# Patient Record
Sex: FEMALE | Race: WHITE | NOT HISPANIC OR LATINO | Employment: FULL TIME | URBAN - METROPOLITAN AREA
[De-identification: names, ages, dates, MRNs, and addresses within clinical notes are randomized per-mention and may not be internally consistent; named-entity substitution may affect disease eponyms.]

---

## 2023-09-18 ENCOUNTER — LAB (OUTPATIENT)
Dept: LAB | Facility: LAB | Age: 63
End: 2023-09-18
Payer: COMMERCIAL

## 2023-09-18 ENCOUNTER — OFFICE VISIT (OUTPATIENT)
Dept: PRIMARY CARE | Facility: CLINIC | Age: 63
End: 2023-09-18
Payer: COMMERCIAL

## 2023-09-18 VITALS
SYSTOLIC BLOOD PRESSURE: 158 MMHG | HEART RATE: 74 BPM | WEIGHT: 250 LBS | HEIGHT: 67 IN | DIASTOLIC BLOOD PRESSURE: 84 MMHG | TEMPERATURE: 98 F | OXYGEN SATURATION: 97 % | BODY MASS INDEX: 39.24 KG/M2

## 2023-09-18 DIAGNOSIS — M25.50 PAIN IN JOINT INVOLVING MULTIPLE SITES: ICD-10-CM

## 2023-09-18 DIAGNOSIS — M25.50 PAIN IN JOINT INVOLVING MULTIPLE SITES: Primary | ICD-10-CM

## 2023-09-18 DIAGNOSIS — M25.572 PAIN AND SWELLING OF LEFT ANKLE: ICD-10-CM

## 2023-09-18 DIAGNOSIS — R00.2 PALPITATIONS: ICD-10-CM

## 2023-09-18 DIAGNOSIS — R03.0 ELEVATED BLOOD PRESSURE READING: ICD-10-CM

## 2023-09-18 DIAGNOSIS — M25.472 PAIN AND SWELLING OF LEFT ANKLE: ICD-10-CM

## 2023-09-18 DIAGNOSIS — Z12.31 ENCOUNTER FOR SCREENING MAMMOGRAM FOR MALIGNANT NEOPLASM OF BREAST: ICD-10-CM

## 2023-09-18 PROBLEM — D69.6 THROMBOCYTOPENIA (CMS-HCC): Status: RESOLVED | Noted: 2023-09-18 | Resolved: 2023-09-18

## 2023-09-18 PROBLEM — N89.8 VAGINAL DISCHARGE: Status: RESOLVED | Noted: 2023-09-18 | Resolved: 2023-09-18

## 2023-09-18 PROBLEM — R91.8 PULMONARY NODULES: Status: RESOLVED | Noted: 2023-09-18 | Resolved: 2023-09-18

## 2023-09-18 PROBLEM — Z86.718 PERSONAL HISTORY OF DVT (DEEP VEIN THROMBOSIS): Status: RESOLVED | Noted: 2023-09-18 | Resolved: 2023-09-18

## 2023-09-18 PROBLEM — R93.89 THICKENED ENDOMETRIUM: Status: RESOLVED | Noted: 2023-09-18 | Resolved: 2023-09-18

## 2023-09-18 PROBLEM — I82.409 DEEP VEIN THROMBOSIS OF LOWER EXTREMITY (MULTI): Status: ACTIVE | Noted: 2023-09-18

## 2023-09-18 PROBLEM — J06.9 VIRAL URI WITH COUGH: Status: RESOLVED | Noted: 2023-09-18 | Resolved: 2023-09-18

## 2023-09-18 PROBLEM — R06.83 SNORING: Status: RESOLVED | Noted: 2023-09-18 | Resolved: 2023-09-18

## 2023-09-18 PROBLEM — I87.001 POST-THROMBOTIC SYNDROME OF RIGHT LOWER EXTREMITY: Status: RESOLVED | Noted: 2023-09-18 | Resolved: 2023-09-18

## 2023-09-18 PROBLEM — R19.00 PELVIC MASS IN FEMALE: Status: RESOLVED | Noted: 2023-09-18 | Resolved: 2023-09-18

## 2023-09-18 LAB
ALANINE AMINOTRANSFERASE (SGPT) (U/L) IN SER/PLAS: 15 U/L (ref 7–45)
ALBUMIN (G/DL) IN SER/PLAS: 3.9 G/DL (ref 3.4–5)
ALKALINE PHOSPHATASE (U/L) IN SER/PLAS: 77 U/L (ref 33–136)
ANION GAP IN SER/PLAS: 13 MMOL/L (ref 10–20)
ASPARTATE AMINOTRANSFERASE (SGOT) (U/L) IN SER/PLAS: 15 U/L (ref 9–39)
BILIRUBIN TOTAL (MG/DL) IN SER/PLAS: 0.4 MG/DL (ref 0–1.2)
C REACTIVE PROTEIN (MG/L) IN SER/PLAS: 0.13 MG/DL
CALCIUM (MG/DL) IN SER/PLAS: 9.1 MG/DL (ref 8.6–10.3)
CARBON DIOXIDE, TOTAL (MMOL/L) IN SER/PLAS: 28 MMOL/L (ref 21–32)
CHLORIDE (MMOL/L) IN SER/PLAS: 105 MMOL/L (ref 98–107)
CREATININE (MG/DL) IN SER/PLAS: 0.77 MG/DL (ref 0.5–1.05)
GFR FEMALE: 86 ML/MIN/1.73M2
GLUCOSE (MG/DL) IN SER/PLAS: 75 MG/DL (ref 74–99)
POC APPEARANCE, URINE: CLEAR
POC BILIRUBIN, URINE: NEGATIVE
POC BLOOD, URINE: NEGATIVE
POC COLOR, URINE: YELLOW
POC GLUCOSE, URINE: NEGATIVE MG/DL
POC KETONES, URINE: NEGATIVE MG/DL
POC LEUKOCYTES, URINE: NEGATIVE
POC NITRITE,URINE: NEGATIVE
POC PH, URINE: 6.5 PH
POC PROTEIN, URINE: NEGATIVE MG/DL
POC SPECIFIC GRAVITY, URINE: <=1.005
POC UROBILINOGEN, URINE: 0.2 EU/DL
POTASSIUM (MMOL/L) IN SER/PLAS: 3.9 MMOL/L (ref 3.5–5.3)
PROTEIN TOTAL: 5.8 G/DL (ref 6.4–8.2)
SEDIMENTATION RATE, ERYTHROCYTE: 11 MM/H (ref 0–30)
SODIUM (MMOL/L) IN SER/PLAS: 142 MMOL/L (ref 136–145)
THYROTROPIN (MIU/L) IN SER/PLAS BY DETECTION LIMIT <= 0.05 MIU/L: 1.41 MIU/L (ref 0.44–3.98)
URATE (MG/DL) IN SER/PLAS: 4.1 MG/DL (ref 2.3–6.7)
UREA NITROGEN (MG/DL) IN SER/PLAS: 10 MG/DL (ref 6–23)

## 2023-09-18 PROCEDURE — 1036F TOBACCO NON-USER: CPT | Performed by: NURSE PRACTITIONER

## 2023-09-18 PROCEDURE — 86431 RHEUMATOID FACTOR QUANT: CPT

## 2023-09-18 PROCEDURE — 86200 CCP ANTIBODY: CPT

## 2023-09-18 PROCEDURE — 84443 ASSAY THYROID STIM HORMONE: CPT

## 2023-09-18 PROCEDURE — 85652 RBC SED RATE AUTOMATED: CPT

## 2023-09-18 PROCEDURE — 86038 ANTINUCLEAR ANTIBODIES: CPT

## 2023-09-18 PROCEDURE — 36415 COLL VENOUS BLD VENIPUNCTURE: CPT

## 2023-09-18 PROCEDURE — 80053 COMPREHEN METABOLIC PANEL: CPT

## 2023-09-18 PROCEDURE — 99214 OFFICE O/P EST MOD 30 MIN: CPT | Performed by: NURSE PRACTITIONER

## 2023-09-18 PROCEDURE — 86140 C-REACTIVE PROTEIN: CPT

## 2023-09-18 PROCEDURE — 81003 URINALYSIS AUTO W/O SCOPE: CPT | Performed by: NURSE PRACTITIONER

## 2023-09-18 PROCEDURE — 84550 ASSAY OF BLOOD/URIC ACID: CPT

## 2023-09-18 RX ORDER — RIVAROXABAN 10 MG/1
10 TABLET, FILM COATED ORAL DAILY
COMMUNITY

## 2023-09-18 RX ORDER — ALBUTEROL SULFATE 90 UG/1
AEROSOL, METERED RESPIRATORY (INHALATION)
COMMUNITY
Start: 2020-01-28

## 2023-09-18 ASSESSMENT — ENCOUNTER SYMPTOMS
CONSTITUTIONAL NEGATIVE: 1
MUSCULOSKELETAL NEGATIVE: 1
RESPIRATORY NEGATIVE: 1
PSYCHIATRIC NEGATIVE: 1
PALPITATIONS: 1

## 2023-09-18 NOTE — PATIENT INSTRUCTIONS
we discussed a few matters concerning your health. I want to provide a summary of our conversation and emphasize some key points:    1. Joint Pain and Redness: We've observed symptoms that might hint at gout. To delve deeper into this and evaluate other potential causes like rheumatoid arthritis, I've ordered specific blood tests.    2. Importance of Hydration: A critical aspect we talked about is the role of proper hydration. Not drinking enough water can lead to dehydration, which can manifest as various symptoms - from joint pain, fatigue, headaches, to palpitations. Consistently staying hydrated can help alleviate some of the symptoms you've described. Please make an effort to increase your water intake. Aim for at least 8 glasses a day or more, depending on your activity level.    3. Palpitations and Elevated Blood Pressure: While anxiety might explain the palpitations you've experienced, we can't overlook other potential causes, especially given today's elevated blood pressure reading. Thyroid tests have been ordered to investigate further.    Urgent Note: Should you experience palpitations, chest pain, or other related symptoms, it's imperative to immediately call 911 or visit the ER. These symptoms might indicate a heart-related issue, requiring quick medical attention.    4. Upcoming Physical: Please ensure you book a comprehensive physical in 4 weeks. During this appointment, we can perform fasting blood tests and, if needed, an EKG to assess your heart. This will also provide an opportunity to reevaluate your joint pain.    Your health and well-being remain our top priority. Should you have any questions or face any alarming symptoms, don't hesitate to contact our office immediately.

## 2023-09-18 NOTE — PROGRESS NOTES
"Subjective   Patient ID: Adelaida Phan is a 63 y.o. female who presents for Ankle Pain (Pt is here c/o left ankle pain which onset this past Sunday w/o injury. States she is sore in the mornings but once she moves more her pain subsides. Concerned with other joint pains as well; over the past year pt has experienced random joint pains w/o injury. Right knee, right wrist and now her left ankle. No OTC meds have been needed. Left urine sample; states Friday she developed palpitations for a three hour time span.).    Patient with complaints of ankle swelling bilaterally.  She states her right ankle does swell due to her DVT and vascular history she did follow-up with vascular in regards to that.  However her left ankles been swelling and was red and painful with no known injury.  She also has been having random joint pains right knee has been swollen with redness.  Patient was concern for possible gout or arthritis being the culprit.  She said no rhyme or reason no known injuries at this time's.  She is also having wrist pain on the right hand.  Patient had a couple episodes of palpitations.  Not currently having.  She knows she does not drink enough fluid but she gets anxious and nervous blood pressures been elevated.    Ankle Pain   There was no injury mechanism. The pain is present in the left ankle. The pain is mild. She has tried elevation for the symptoms. The treatment provided mild relief.        Review of Systems   Constitutional: Negative.    Respiratory: Negative.     Cardiovascular:  Positive for palpitations.   Genitourinary: Negative.    Musculoskeletal: Negative.    Psychiatric/Behavioral: Negative.         Objective   /84   Pulse 74   Temp 36.7 °C (98 °F)   Ht 1.702 m (5' 7\")   Wt 113 kg (250 lb)   SpO2 97%   BMI 39.16 kg/m²     Physical Exam  Constitutional:       Appearance: She is obese.   Cardiovascular:      Rate and Rhythm: Normal rate.      Pulses: Normal pulses.      Heart sounds: " Normal heart sounds.   Pulmonary:      Effort: Pulmonary effort is normal.      Breath sounds: Normal breath sounds.   Musculoskeletal:      Right lower leg: Edema present.      Left lower leg: Edema present.      Comments: Right lower extremity swelling 2+ nonpitting.  Left lower 1+ nonpitting.  Tenderness posterior to palpation of the left ankle.   Neurological:      Mental Status: She is alert.         Assessment/Plan   Problem List Items Addressed This Visit    None  Visit Diagnoses       Pain in joint involving multiple sites    -  Primary    Relevant Orders    Comprehensive Metabolic Panel    MICHELLE with Reflex to MIREYA    Rheumatoid Factor    C-Reactive Protein    Sedimentation Rate    Uric Acid    Citrulline Antibody, IgG    Pain and swelling of left ankle        Relevant Orders    Comprehensive Metabolic Panel    MICHELLE with Reflex to MIREYA    Rheumatoid Factor    C-Reactive Protein    Sedimentation Rate    Uric Acid    Citrulline Antibody, IgG    Encounter for screening mammogram for malignant neoplasm of breast        Relevant Orders    BI mammo bilateral screening tomosynthesis    Palpitations        Relevant Orders    TSH with reflex to Free T4 if abnormal    Elevated blood pressure reading        Relevant Orders    TSH with reflex to Free T4 if abnormal

## 2023-09-19 LAB
ANTI-NUCLEAR ANTIBODY (ANA): NEGATIVE
RHEUMATOID FACTOR (IU/ML) IN SERUM OR PLASMA: <10 IU/ML (ref 0–15)

## 2023-09-20 LAB — CITRULLINE ANTIBODY, IGG: <1 U/ML

## 2023-09-21 ENCOUNTER — TELEPHONE (OUTPATIENT)
Dept: PRIMARY CARE | Facility: CLINIC | Age: 63
End: 2023-09-21
Payer: COMMERCIAL

## 2023-09-21 NOTE — TELEPHONE ENCOUNTER
Result Communication    Resulted Orders   POCT UA Automated manually resulted   Result Value Ref Range    POC Color, Urine Yellow Straw, Yellow, Light Yellow    POC Appearance, Urine Clear Clear    POC Specific Gravity, Urine <=1.005 1.005 - 1.035    POC PH, Urine 6.5 No Reference Range Established PH    POC Protein, Urine NEGATIVE NEGATIVE, 30 (1+) mg/dl    POC Glucose, Urine NEGATIVE NEGATIVE mg/dl    POC Blood, Urine NEGATIVE NEGATIVE    POC Ketones, Urine NEGATIVE NEGATIVE mg/dl    POC Bilirubin, Urine NEGATIVE NEGATIVE    POC Urobilinogen, Urine 0.2 0.2, 1.0 EU/DL    Poc Nitrate, Urine NEGATIVE NEGATIVE    POC Leukocytes, Urine NEGATIVE NEGATIVE       11:53 AM      Results were successfully communicated with the patient and they acknowledged their understanding.

## 2023-09-21 NOTE — TELEPHONE ENCOUNTER
Result Communication    Resulted Orders   Comprehensive Metabolic Panel   Result Value Ref Range    Glucose 75 74 - 99 mg/dL    Sodium 142 136 - 145 mmol/L    Potassium 3.9 3.5 - 5.3 mmol/L    Chloride 105 98 - 107 mmol/L    Bicarbonate 28 21 - 32 mmol/L    Anion Gap 13 10 - 20 mmol/L    Urea Nitrogen 10 6 - 23 mg/dL    Creatinine 0.77 0.50 - 1.05 mg/dL    GFR Female 86 >90 mL/min/1.73m2      Comment:       CALCULATIONS OF ESTIMATED GFR ARE PERFORMED   USING THE 2021 CKD-EPI STUDY REFIT EQUATION   WITHOUT THE RACE VARIABLE FOR THE IDMS-TRACEABLE   CREATININE METHODS.    https://jasn.asnjournals.org/content/early/2021/09/22/ASN.7215237348    Calcium 9.1 8.6 - 10.3 mg/dL    Albumin 3.9 3.4 - 5.0 g/dL    Alkaline Phosphatase 77 33 - 136 U/L    Total Protein 5.8 (L) 6.4 - 8.2 g/dL    AST 15 9 - 39 U/L    Total Bilirubin 0.4 0.0 - 1.2 mg/dL    ALT (SGPT) 15 7 - 45 U/L      Comment:       Patients treated with Sulfasalazine may generate    falsely decreased results for ALT.   MICHELLE with Reflex to MIREYA   Result Value Ref Range    ANTI-NUCLEAR ANTIBODY (MICHELLE) NEGATIVE NEGATIVE      Comment:        The Antinuclear Antibody (MICHELLE) test was performed using    indirect immunofluorescence assay with HEp-2 cells slide.   Rheumatoid Factor   Result Value Ref Range    Rheumatoid Factor <10 0 - 15 IU/mL   C-Reactive Protein   Result Value Ref Range    CRP 0.13 mg/dL      Comment:      REF VALUE  < 1.00   Sedimentation Rate   Result Value Ref Range    Sedimentation Rate 11 0 - 30 mm/h   Uric Acid   Result Value Ref Range    Uric Acid 4.1 2.3 - 6.7 mg/dL      Comment:       Venipuncture immediately after or during the    administration of Metamizole may lead to falsely   low results. Testing should be performed immediately   prior to Metamizole dosing.   TSH with reflex to Free T4 if abnormal   Result Value Ref Range    TSH 1.41 0.44 - 3.98 mIU/L      Comment:       TSH testing is performed using different testing    methodology at  [facility] than at other    [facility]. Direct result comparisons should    only be made within the same method.       11:52 AM      Results were successfully communicated with the patient and they acknowledged their understanding.

## 2023-09-21 NOTE — TELEPHONE ENCOUNTER
Result Communication    Resulted Orders   Comprehensive Metabolic Panel   Result Value Ref Range    Glucose 75 74 - 99 mg/dL    Sodium 142 136 - 145 mmol/L    Potassium 3.9 3.5 - 5.3 mmol/L    Chloride 105 98 - 107 mmol/L    Bicarbonate 28 21 - 32 mmol/L    Anion Gap 13 10 - 20 mmol/L    Urea Nitrogen 10 6 - 23 mg/dL    Creatinine 0.77 0.50 - 1.05 mg/dL    GFR Female 86 >90 mL/min/1.73m2      Comment:       CALCULATIONS OF ESTIMATED GFR ARE PERFORMED   USING THE 2021 CKD-EPI STUDY REFIT EQUATION   WITHOUT THE RACE VARIABLE FOR THE IDMS-TRACEABLE   CREATININE METHODS.    https://jasn.asnjournals.org/content/early/2021/09/22/ASN.1452565761    Calcium 9.1 8.6 - 10.3 mg/dL    Albumin 3.9 3.4 - 5.0 g/dL    Alkaline Phosphatase 77 33 - 136 U/L    Total Protein 5.8 (L) 6.4 - 8.2 g/dL    AST 15 9 - 39 U/L    Total Bilirubin 0.4 0.0 - 1.2 mg/dL    ALT (SGPT) 15 7 - 45 U/L      Comment:       Patients treated with Sulfasalazine may generate    falsely decreased results for ALT.   MICHELLE with Reflex to MIREYA   Result Value Ref Range    ANTI-NUCLEAR ANTIBODY (MICHELLE) NEGATIVE NEGATIVE      Comment:        The Antinuclear Antibody (MICHELLE) test was performed using    indirect immunofluorescence assay with HEp-2 cells slide.   Rheumatoid Factor   Result Value Ref Range    Rheumatoid Factor <10 0 - 15 IU/mL   C-Reactive Protein   Result Value Ref Range    CRP 0.13 mg/dL      Comment:      REF VALUE  < 1.00   Sedimentation Rate   Result Value Ref Range    Sedimentation Rate 11 0 - 30 mm/h   Uric Acid   Result Value Ref Range    Uric Acid 4.1 2.3 - 6.7 mg/dL      Comment:       Venipuncture immediately after or during the    administration of Metamizole may lead to falsely   low results. Testing should be performed immediately   prior to Metamizole dosing.   Citrulline Antibody, IgG   Result Value Ref Range    Citrulline Antibody, IgG <1 U/ML      Comment:      THE TEST FOR ANTIBODIES SPECIFIC FOR CYCLIC  CITRULLINATED PEPTIDE (CCP) HAS SHOWN  TO BE  VALUABLE IN THE DIAGNOSIS OF RHEUMATOID  ARTHRITIS. THE DIAGNOSTIC VALUE OF  ANTIBODIES TO CCP IN JUVENILE RHEUMATOID  ARTHRITIS PATIENTS HAS NOT BEEN DETERMINED.  ANTIBODIES TO CENTROMERE OR SS-A AND MYELOMA   IGG MAY BE REACTIVE IN THIS ASSAY.    REF VALUES    NEGATIVE  < 3 U/ML    POSITIVE  >=3 U/ML   TSH with reflex to Free T4 if abnormal   Result Value Ref Range    TSH 1.41 0.44 - 3.98 mIU/L      Comment:       TSH testing is performed using different testing    methodology at [facility] than at other    [facility] hospitals. Direct result comparisons should    only be made within the same method.       11:53 AM      Results were successfully communicated with the patient and they acknowledged their understanding.

## 2023-10-24 ENCOUNTER — APPOINTMENT (OUTPATIENT)
Dept: PRIMARY CARE | Facility: CLINIC | Age: 63
End: 2023-10-24
Payer: COMMERCIAL

## 2023-11-06 ENCOUNTER — APPOINTMENT (OUTPATIENT)
Dept: PRIMARY CARE | Facility: CLINIC | Age: 63
End: 2023-11-06
Payer: COMMERCIAL

## 2023-11-15 ENCOUNTER — APPOINTMENT (OUTPATIENT)
Dept: PRIMARY CARE | Facility: CLINIC | Age: 63
End: 2023-11-15
Payer: COMMERCIAL

## 2023-11-27 ENCOUNTER — APPOINTMENT (OUTPATIENT)
Dept: PRIMARY CARE | Facility: CLINIC | Age: 63
End: 2023-11-27
Payer: COMMERCIAL

## 2023-12-14 ENCOUNTER — TELEMEDICINE (OUTPATIENT)
Dept: PRIMARY CARE | Facility: CLINIC | Age: 63
End: 2023-12-14
Payer: COMMERCIAL

## 2023-12-14 DIAGNOSIS — J01.90 ACUTE BACTERIAL SINUSITIS: Primary | ICD-10-CM

## 2023-12-14 DIAGNOSIS — B96.89 ACUTE BACTERIAL SINUSITIS: Primary | ICD-10-CM

## 2023-12-14 PROCEDURE — 99203 OFFICE O/P NEW LOW 30 MIN: CPT | Performed by: STUDENT IN AN ORGANIZED HEALTH CARE EDUCATION/TRAINING PROGRAM

## 2023-12-14 RX ORDER — AMOXICILLIN AND CLAVULANATE POTASSIUM 875; 125 MG/1; MG/1
875 TABLET, FILM COATED ORAL 2 TIMES DAILY
Qty: 20 TABLET | Refills: 0 | Status: SHIPPED | OUTPATIENT
Start: 2023-12-14 | End: 2023-12-24

## 2023-12-14 RX ORDER — BENZONATATE 100 MG/1
100 CAPSULE ORAL 3 TIMES DAILY PRN
Qty: 42 CAPSULE | Refills: 0 | Status: SHIPPED | OUTPATIENT
Start: 2023-12-14 | End: 2024-01-13

## 2023-12-14 NOTE — PROGRESS NOTES
Telephone visit  HPI  Patient reported having not been good for the past 3 weeks.  He started with chills sore throat nose congestion till yesterday when she started having cough.  Cough is with sputum but she is unable to bring it up.  She have some runny nose and some pressure over (the teeth) the face.   She reports a rattling nose coming from the lungs.   She denied fever, shortness of breath, chest pain, headache, sore throat, tearing from the eyes.    She did not tested for COVID and does not have any recent history of traveling.  She denies asthma,      Review of Systems  All other systems have been reviewed and are negative.    Visit Vitals  Smoking Status Former         Physical Exam  General: Alert and oriented. Appears well-nourished and in no acute distress.  Lungs-patient speaks full sentences, no labored breathing  Psychological: Appropriate mood and affect.   Skin: No visible rashes or lesions.     Assessment/Plan   She is 63 years old female who presented with chest congestion.    # Sinus congestion  -Limited physical exam due to telephone visit  -Augmentin for 10 days twice daily  -Tessalon Perles 100 mg TID  -Patient was instructed to come in office in a week if the symptoms get worse or not relieved with therapy      No red flags. Follow up in in 1 week if the symptoms did not improve or next month for wellness visit    Problem List Items Addressed This Visit    None  Visit Diagnoses       Acute bacterial sinusitis    -  Primary    Relevant Medications    amoxicillin-pot clavulanate (Augmentin) 875-125 mg tablet    benzonatate (Tessalon) 100 mg capsule            I have personally reviewed all available pertinent labs, imaging, and consult notes with the patient.     All questions and concerns were addressed. Patient verbalizes understanding instructions and agrees with established plan of care.     I discussed the plan with Dr.Zen Millie Bravo MD  Family medicine resident  PGY2

## 2024-09-18 ENCOUNTER — OFFICE VISIT (OUTPATIENT)
Dept: CARDIOLOGY | Facility: HOSPITAL | Age: 64
End: 2024-09-18
Payer: MEDICARE

## 2024-09-18 VITALS
WEIGHT: 241.4 LBS | OXYGEN SATURATION: 96 % | SYSTOLIC BLOOD PRESSURE: 147 MMHG | DIASTOLIC BLOOD PRESSURE: 85 MMHG | HEART RATE: 91 BPM | BODY MASS INDEX: 37.81 KG/M2

## 2024-09-18 DIAGNOSIS — Z86.718 PERSONAL HISTORY OF DVT (DEEP VEIN THROMBOSIS): ICD-10-CM

## 2024-09-18 DIAGNOSIS — I87.009 POST-THROMBOTIC SYNDROME: Primary | ICD-10-CM

## 2024-09-18 PROCEDURE — 1159F MED LIST DOCD IN RCRD: CPT | Performed by: INTERNAL MEDICINE

## 2024-09-18 PROCEDURE — 1160F RVW MEDS BY RX/DR IN RCRD: CPT | Performed by: INTERNAL MEDICINE

## 2024-09-18 PROCEDURE — 1036F TOBACCO NON-USER: CPT | Performed by: INTERNAL MEDICINE

## 2024-09-18 PROCEDURE — 99213 OFFICE O/P EST LOW 20 MIN: CPT | Performed by: INTERNAL MEDICINE

## 2024-09-18 NOTE — PROGRESS NOTES
History of Present Illness:  Adelaida Phan is a/an 64 y.o. woman with history of recurrent deep vein thrombosis. She is on long-term anticoagulation with secondary prevention dose of rivaroxaban.    She denies bleeding including epistaxis, gingival bleeding, hemoptysis, hematemesis, hematochezia, melena, hematuria, and vaginal bleeding.    Cough, congestion since Friday  No fever as far as she knows  Legs are good      Past Medical History:   Diagnosis Date    Acute embolism and thrombosis of other specified deep vein of unspecified lower extremity (Multi)     Deep vein thrombosis (DVT) of other vein of lower extremity    Encounter for full-term uncomplicated delivery ([facility])      (spontaneous vaginal delivery)    Other pulmonary embolism without acute cor pulmonale (Multi)     Pulmonary embolism without acute cor pulmonale, unspecified chronicity, unspecified pulmonary embolism type    Personal history of malignant melanoma of skin     History of malignant melanoma of skin    Personal history of other endocrine, nutritional and metabolic disease     History of morbid obesity    Post-thrombotic syndrome of right lower extremity 2023    Snoring 2023    Thickened endometrium 2023    Thrombocytopenia (CMS-HCC) 2023    Vaginal discharge 2023    Viral URI with cough 2023     Past Surgical History:   Procedure Laterality Date    OTHER SURGICAL HISTORY  2019    Bunionectomy    OTHER SURGICAL HISTORY  2019    Finger fracture repair    OTHER SURGICAL HISTORY  2019    Rotator cuff repair    OTHER SURGICAL HISTORY  2019    Tubal ligation     Social History     Tobacco Use    Smoking status: Former     Types: Cigarettes    Smokeless tobacco: Never   Substance Use Topics    Alcohol use: Yes     Alcohol/week: 7.0 standard drinks of alcohol     Types: 7 Standard drinks or equivalent per week    Drug use: Never     No family history on file.  Current Outpatient  Medications   Medication Sig Dispense Refill    albuterol 90 mcg/actuation inhaler Inhale.      Xarelto 10 mg tablet Take 1 tablet (10 mg) by mouth once daily.       No current facility-administered medications for this visit.       Physical Examination:  Blood pressure 147/85, pulse 91, weight 110 kg (241 lb 6.5 oz), SpO2 96%.  No distress  No JVD or carotid bruits  Lungs clear bilaterally  Heart regular and without murmurs  Abdomen soft and non-tender  Right leg swelling more than left with mild dependent plethora  Pulses intact    Pertinent Labs:    Pertinent Imaging:  Venous duplex ultrasound 12/19/2019   CONCLUSIONS:  Right Lower Venous: There is age indeterminate deep vein thrombosis visualized in the popliteal vein. There are chronic changes visualized in the proximal femoral, posterior tibial and profunda veins. Technically difficult due to patient cooperation, leg edema, and body habitus. Right dist femoral vein and peroneal veins were visualized in segments due to patient cooperation. Cannot rule out DVT in non-visualized segements. Visualized segments appear negative for DVT.  Left Lower Venous: Left common femoral vein is negative for deep vein thrombus.  Comparison:  Compared with study from 8/19/2019, There is age indeterminate deep vein thrombosis visualized in the right popliteal vein. There is chronic changes visualized in the right proximal femoral, posterior tibial, and profunda veins.  Additional Findings:    Diagnoses and all orders for this visit:  Post-thrombotic syndrome (Primary)  Personal history of DVT (deep vein thrombosis)  Continue rivaroxaban 10 mg    Follow up in one year.    Ora Holloway MD, MS

## 2024-10-18 PROBLEM — Z86.718 HISTORY OF RECURRENT DEEP VEIN THROMBOSIS (DVT): Status: ACTIVE | Noted: 2023-09-18

## 2024-10-18 PROBLEM — Z86.718 HISTORY OF RECURRENT DEEP VEIN THROMBOSIS (DVT): Status: ACTIVE | Noted: 2024-10-18

## 2024-10-18 PROBLEM — E04.2 MULTIPLE THYROID NODULES: Status: ACTIVE | Noted: 2024-10-18

## 2024-10-18 PROBLEM — M19.90 ARTHRITIS: Status: ACTIVE | Noted: 2024-10-18

## 2025-04-15 ENCOUNTER — TELEPHONE (OUTPATIENT)
Dept: CARDIOLOGY | Facility: HOSPITAL | Age: 65
End: 2025-04-15
Payer: MEDICARE

## 2025-04-16 DIAGNOSIS — Z86.718 PERSONAL HISTORY OF DVT (DEEP VEIN THROMBOSIS): Primary | ICD-10-CM

## 2025-04-16 RX ORDER — RIVAROXABAN 10 MG/1
10 TABLET, FILM COATED ORAL DAILY
Qty: 90 TABLET | Refills: 3 | Status: SHIPPED | OUTPATIENT
Start: 2025-04-16

## 2025-08-09 ENCOUNTER — OFFICE VISIT (OUTPATIENT)
Dept: URGENT CARE | Age: 65
End: 2025-08-09
Payer: MEDICARE

## 2025-08-09 ENCOUNTER — ANCILLARY PROCEDURE (OUTPATIENT)
Dept: URGENT CARE | Age: 65
End: 2025-08-09
Payer: MEDICARE

## 2025-08-09 VITALS
DIASTOLIC BLOOD PRESSURE: 91 MMHG | WEIGHT: 250 LBS | HEART RATE: 63 BPM | OXYGEN SATURATION: 95 % | TEMPERATURE: 98.5 F | RESPIRATION RATE: 20 BRPM | SYSTOLIC BLOOD PRESSURE: 151 MMHG | BODY MASS INDEX: 39.16 KG/M2

## 2025-08-09 DIAGNOSIS — R06.2 WHEEZING: ICD-10-CM

## 2025-08-09 DIAGNOSIS — Z72.0 TOBACCO USE: ICD-10-CM

## 2025-08-09 DIAGNOSIS — R05.1 ACUTE COUGH: Primary | ICD-10-CM

## 2025-08-09 DIAGNOSIS — R05.1 ACUTE COUGH: ICD-10-CM

## 2025-08-09 PROCEDURE — 71046 X-RAY EXAM CHEST 2 VIEWS: CPT

## 2025-08-09 PROCEDURE — 99203 OFFICE O/P NEW LOW 30 MIN: CPT

## 2025-08-09 PROCEDURE — 1159F MED LIST DOCD IN RCRD: CPT

## 2025-08-09 RX ORDER — DOXYCYCLINE HYCLATE 100 MG
100 TABLET ORAL 2 TIMES DAILY
Qty: 14 TABLET | Refills: 0 | Status: SHIPPED | OUTPATIENT
Start: 2025-08-09 | End: 2025-08-16

## 2025-08-09 RX ORDER — ALBUTEROL SULFATE 90 UG/1
2 INHALANT RESPIRATORY (INHALATION) EVERY 6 HOURS PRN
Qty: 18 G | Refills: 0 | Status: SHIPPED | OUTPATIENT
Start: 2025-08-09 | End: 2026-08-09

## 2025-08-09 RX ORDER — PREDNISONE 20 MG/1
40 TABLET ORAL DAILY
Qty: 10 TABLET | Refills: 0 | Status: SHIPPED | OUTPATIENT
Start: 2025-08-09 | End: 2025-08-14

## 2025-08-09 RX ORDER — PREDNISONE 20 MG/1
20 TABLET ORAL DAILY
Qty: 5 TABLET | Refills: 0 | Status: SHIPPED | OUTPATIENT
Start: 2025-08-09 | End: 2025-08-09

## 2025-08-09 NOTE — PROGRESS NOTES
Subjective   Patient ID: Adelaida Phan is a 65 y.o. female. They present today with a chief complaint of Cough (For 7 days).    History of Present Illness    History provided by:  Patient   used: No        Past Medical History  Allergies as of 08/09/2025 - Reviewed 08/09/2025   Allergen Reaction Noted    Codeine Nausea Only and Unknown 09/18/2023       Prescriptions Prior to Admission[1]     Medical History[2]    Surgical History[3]     reports that she has quit smoking. Her smoking use included cigarettes. She has never used smokeless tobacco. She reports current alcohol use of about 7.0 standard drinks of alcohol per week. She reports that she does not use drugs.    Review of Systems  Review of Systems   All other systems reviewed and are negative.                                 Objective    Vitals:    08/09/25 1240   BP: (!) 151/91   BP Location: Left arm   Patient Position: Sitting   BP Cuff Size: Large adult   Pulse: 63   Resp: 20   Temp: 36.9 °C (98.5 °F)   TempSrc: Oral   SpO2: 95%   Weight: 113 kg (250 lb)     No LMP recorded (lmp unknown). (Menstrual status: Menopausal).    Physical Exam  Vitals and nursing note reviewed.   Constitutional:       General: She is not in acute distress.     Appearance: Normal appearance. She is normal weight. She is not ill-appearing, toxic-appearing or diaphoretic.   HENT:      Head: Normocephalic and atraumatic.      Right Ear: Tympanic membrane, ear canal and external ear normal. There is no impacted cerumen.      Left Ear: Tympanic membrane, ear canal and external ear normal. There is no impacted cerumen.      Nose: Congestion present.      Mouth/Throat:      Mouth: Mucous membranes are moist.      Pharynx: No oropharyngeal exudate or posterior oropharyngeal erythema.     Eyes:      General: No scleral icterus.        Right eye: No discharge.         Left eye: No discharge.      Extraocular Movements: Extraocular movements intact.       Conjunctiva/sclera: Conjunctivae normal.      Pupils: Pupils are equal, round, and reactive to light.       Cardiovascular:      Rate and Rhythm: Normal rate and regular rhythm.      Pulses: Normal pulses.      Heart sounds: Normal heart sounds. No murmur heard.     No friction rub. No gallop.   Pulmonary:      Effort: Pulmonary effort is normal. No respiratory distress.      Breath sounds: Wheezing and rhonchi present. No rales.   Abdominal:      General: Abdomen is flat.     Musculoskeletal:         General: Normal range of motion.      Cervical back: Normal range of motion and neck supple. No rigidity or tenderness.      Right lower leg: No edema.      Left lower leg: No edema.      Comments: No calf or popliteal tenderness.  No lower extremity edema. LE WWP, sensation intact capillary fill time less than 2 seconds    Lymphadenopathy:      Cervical: No cervical adenopathy.     Skin:     General: Skin is warm and dry.      Capillary Refill: Capillary refill takes less than 2 seconds.      Coloration: Skin is not jaundiced or pale.      Findings: No rash.     Neurological:      General: No focal deficit present.      Mental Status: She is alert.      Gait: Gait normal.     Psychiatric:         Mood and Affect: Mood normal.         Behavior: Behavior normal.         Procedures    Point of Care Test & Imaging Results from this visit  No results found for this visit on 08/09/25.   Imaging  XR chest 2 views  Result Date: 8/9/2025  No radiographic evidence of an acute cardiopulmonary process.   MACRO: None.   Signed by: Jeffersno Soto 8/9/2025 1:41 PM Dictation workstation:   [id number]      Cardiology, Vascular, and Other Imaging  No other imaging results found for the past 2 days      Diagnostic study results (if any) were reviewed by Annia Devries PA-C.    Assessment/Plan   Allergies, medications, history, and pertinent labs/EKGs/Imaging reviewed by Annia Devries PA-C.     Medical Decision Making  65-year-old  female with past medical history of DVT and PE anticoagulated with Xarelto, tobacco use presents with complaint of nasal congestion, postnasal drip, cough, sore throat, subjective fever and chills ongoing for the past 7 days.  She states she was in [place] around several people who had similar symptoms.  No chest pain, shortness of breath or extremity swelling or pain.  She is compliant with Xarelto and has not missed a dose.  No asthma or COPD diagnosis but states she smoked for a long time, quit for 25 years and then started smoking again about a year ago.  Does have very frequent persistent cough and sensation of wheezing when she gets sick.  Exam remarkable for diffuse rhonchi, discrete left posterior wheeze.  No increased work of breathing or respiratory distress.  No extremity swelling or pain.  Chest x-ray no acute cardiopulmonary finding.  Consider underling COPD and will treat accordingly.  Fup with PCP discuss frequent cough/bronchitis.  Recheck BP.  No features respiratory distress, severe reactive airway, sepsis, decompensated CHF, PE or other emergency.  Encouraged follow-up with primary care provider.  Discussed expected course, indications for return or for presentation to emergency department.  Discharged good condition agreeable to plan as discussed.       Orders and Diagnoses  Diagnoses and all orders for this visit:  Acute cough  -     XR chest 2 views; Future  -     doxycycline (Vibra-Tabs) 100 mg tablet; Take 1 tablet (100 mg) by mouth 2 times a day for 7 days. Take with a full glass of water and do not lie down for at least 30 minutes after.  -     albuterol 90 mcg/actuation inhaler; Inhale 2 puffs every 6 hours if needed for wheezing.  -     Referral to Primary Care; Future  -     predniSONE (Deltasone) 20 mg tablet; Take 2 tablets (40 mg) by mouth once daily for 5 days.  Tobacco use  Wheezing      Medical Admin Record      Patient disposition: Home    Electronically signed by Annia DE LA FUENTE  MANISH Devries  8:00 PM           [1] (Not in a hospital admission)   [2]   Past Medical History:  Diagnosis Date    Acute embolism and thrombosis of other specified deep vein of unspecified lower extremity     Deep vein thrombosis (DVT) of other vein of lower extremity    Encounter for full-term uncomplicated delivery      (spontaneous vaginal delivery)    Other pulmonary embolism without acute cor pulmonale     Pulmonary embolism without acute cor pulmonale, unspecified chronicity, unspecified pulmonary embolism type    Personal history of malignant melanoma of skin     History of malignant melanoma of skin    Personal history of other endocrine, nutritional and metabolic disease     History of morbid obesity    Post-thrombotic syndrome of right lower extremity 2023    Snoring 2023    Thickened endometrium 2023    Thrombocytopenia 2023    Vaginal discharge 2023    Viral URI with cough 2023   [3]   Past Surgical History:  Procedure Laterality Date    OTHER SURGICAL HISTORY  2019    Bunionectomy    OTHER SURGICAL HISTORY  2019    Finger fracture repair    OTHER SURGICAL HISTORY  2019    Rotator cuff repair    OTHER SURGICAL HISTORY  2019    Tubal ligation